# Patient Record
Sex: FEMALE | Race: BLACK OR AFRICAN AMERICAN | NOT HISPANIC OR LATINO | Employment: FULL TIME | URBAN - METROPOLITAN AREA
[De-identification: names, ages, dates, MRNs, and addresses within clinical notes are randomized per-mention and may not be internally consistent; named-entity substitution may affect disease eponyms.]

---

## 2017-07-31 ENCOUNTER — TELEPHONE (OUTPATIENT)
Dept: INTERNAL MEDICINE | Facility: CLINIC | Age: 42
End: 2017-07-31

## 2017-07-31 NOTE — TELEPHONE ENCOUNTER
----- Message from Diane Cesar sent at 7/31/2017  2:43 PM CDT -----  Contact: Dr Margaret Ansari - [phone] to get patients latest labs faxed to them, please fax to [phone]

## 2017-10-12 ENCOUNTER — CLINICAL SUPPORT (OUTPATIENT)
Dept: OTHER | Facility: CLINIC | Age: 42
End: 2017-10-12
Payer: COMMERCIAL

## 2017-10-12 DIAGNOSIS — Z00.8 HEALTH EXAMINATION IN POPULATION SURVEYS: Primary | ICD-10-CM

## 2017-10-12 PROCEDURE — 82947 ASSAY GLUCOSE BLOOD QUANT: CPT | Mod: QW,S$GLB,, | Performed by: INTERNAL MEDICINE

## 2017-10-12 PROCEDURE — 99401 PREV MED CNSL INDIV APPRX 15: CPT | Mod: S$GLB,,, | Performed by: INTERNAL MEDICINE

## 2017-10-12 PROCEDURE — 80061 LIPID PANEL: CPT | Mod: QW,S$GLB,, | Performed by: INTERNAL MEDICINE

## 2017-10-13 VITALS
WEIGHT: 154 LBS | DIASTOLIC BLOOD PRESSURE: 72 MMHG | SYSTOLIC BLOOD PRESSURE: 124 MMHG | BODY MASS INDEX: 23.34 KG/M2 | HEIGHT: 68 IN

## 2017-10-13 LAB
GLUCOSE SERPL-MCNC: NORMAL MG/DL (ref 60–140)
POC CHOLESTEROL, HDL: 51 MG/DL (ref 40–?)
POC CHOLESTEROL, LDL: NORMAL MG/DL (ref ?–160)
POC CHOLESTEROL, TOTAL: 135 MG/DL (ref ?–240)
POC GLUCOSE FASTING: 99 MG/DL (ref 60–110)
POC TOTAL CHOLESTEROL / HDL RATIO: 2.65 (ref ?–6)
POC TRIGLYCERIDES: 44 MG/DL (ref ?–160)

## 2017-12-21 ENCOUNTER — LAB VISIT (OUTPATIENT)
Dept: LAB | Facility: HOSPITAL | Age: 42
End: 2017-12-21
Attending: INTERNAL MEDICINE
Payer: COMMERCIAL

## 2017-12-21 DIAGNOSIS — Z00.00 PREVENTATIVE HEALTH CARE: ICD-10-CM

## 2017-12-21 LAB
25(OH)D3+25(OH)D2 SERPL-MCNC: 31 NG/ML
ALBUMIN SERPL BCP-MCNC: 4.1 G/DL
ALP SERPL-CCNC: 73 U/L
ALT SERPL W/O P-5'-P-CCNC: 13 U/L
ANION GAP SERPL CALC-SCNC: 8 MMOL/L
AST SERPL-CCNC: 13 U/L
BASOPHILS # BLD AUTO: 0.03 K/UL
BASOPHILS NFR BLD: 0.4 %
BILIRUB SERPL-MCNC: 1.1 MG/DL
BUN SERPL-MCNC: 12 MG/DL
CALCIUM SERPL-MCNC: 9.7 MG/DL
CHLORIDE SERPL-SCNC: 105 MMOL/L
CHOLEST SERPL-MCNC: 143 MG/DL
CHOLEST/HDLC SERPL: 2.7 {RATIO}
CO2 SERPL-SCNC: 26 MMOL/L
CREAT SERPL-MCNC: 0.8 MG/DL
DIFFERENTIAL METHOD: ABNORMAL
EOSINOPHIL # BLD AUTO: 0.2 K/UL
EOSINOPHIL NFR BLD: 2.3 %
ERYTHROCYTE [DISTWIDTH] IN BLOOD BY AUTOMATED COUNT: 13.3 %
EST. GFR  (AFRICAN AMERICAN): >60 ML/MIN/1.73 M^2
EST. GFR  (NON AFRICAN AMERICAN): >60 ML/MIN/1.73 M^2
GLUCOSE SERPL-MCNC: 76 MG/DL
HCT VFR BLD AUTO: 36.1 %
HDLC SERPL-MCNC: 53 MG/DL
HDLC SERPL: 37.1 %
HGB BLD-MCNC: 12.1 G/DL
IMM GRANULOCYTES # BLD AUTO: 0.02 K/UL
IMM GRANULOCYTES NFR BLD AUTO: 0.2 %
LDLC SERPL CALC-MCNC: 79.2 MG/DL
LYMPHOCYTES # BLD AUTO: 2.6 K/UL
LYMPHOCYTES NFR BLD: 31.3 %
MCH RBC QN AUTO: 29.5 PG
MCHC RBC AUTO-ENTMCNC: 33.5 G/DL
MCV RBC AUTO: 88 FL
MONOCYTES # BLD AUTO: 0.5 K/UL
MONOCYTES NFR BLD: 5.5 %
NEUTROPHILS # BLD AUTO: 5.1 K/UL
NEUTROPHILS NFR BLD: 60.3 %
NONHDLC SERPL-MCNC: 90 MG/DL
NRBC BLD-RTO: 0 /100 WBC
PLATELET # BLD AUTO: 292 K/UL
PMV BLD AUTO: 10.9 FL
POTASSIUM SERPL-SCNC: 3.7 MMOL/L
PROT SERPL-MCNC: 7.7 G/DL
RBC # BLD AUTO: 4.1 M/UL
SODIUM SERPL-SCNC: 139 MMOL/L
TRIGL SERPL-MCNC: 54 MG/DL
TSH SERPL DL<=0.005 MIU/L-ACNC: 1.52 UIU/ML
WBC # BLD AUTO: 8.39 K/UL

## 2017-12-21 PROCEDURE — 80053 COMPREHEN METABOLIC PANEL: CPT

## 2017-12-21 PROCEDURE — 82306 VITAMIN D 25 HYDROXY: CPT

## 2017-12-21 PROCEDURE — 80061 LIPID PANEL: CPT

## 2017-12-21 PROCEDURE — 85025 COMPLETE CBC W/AUTO DIFF WBC: CPT

## 2017-12-21 PROCEDURE — 36415 COLL VENOUS BLD VENIPUNCTURE: CPT | Mod: PO

## 2017-12-21 PROCEDURE — 84443 ASSAY THYROID STIM HORMONE: CPT

## 2017-12-22 ENCOUNTER — PATIENT OUTREACH (OUTPATIENT)
Dept: ADMINISTRATIVE | Facility: HOSPITAL | Age: 42
End: 2017-12-22

## 2018-01-15 ENCOUNTER — OFFICE VISIT (OUTPATIENT)
Dept: INTERNAL MEDICINE | Facility: CLINIC | Age: 43
End: 2018-01-15
Payer: COMMERCIAL

## 2018-01-15 VITALS
HEART RATE: 82 BPM | OXYGEN SATURATION: 99 % | TEMPERATURE: 98 F | WEIGHT: 149.94 LBS | HEIGHT: 68 IN | BODY MASS INDEX: 22.72 KG/M2 | DIASTOLIC BLOOD PRESSURE: 68 MMHG | SYSTOLIC BLOOD PRESSURE: 112 MMHG

## 2018-01-15 DIAGNOSIS — Z00.00 ROUTINE GENERAL MEDICAL EXAMINATION AT A HEALTH CARE FACILITY: Primary | ICD-10-CM

## 2018-01-15 PROCEDURE — 99999 PR PBB SHADOW E&M-EST. PATIENT-LVL III: CPT | Mod: PBBFAC,,, | Performed by: INTERNAL MEDICINE

## 2018-01-15 PROCEDURE — 99396 PREV VISIT EST AGE 40-64: CPT | Mod: S$GLB,,, | Performed by: INTERNAL MEDICINE

## 2018-01-15 NOTE — PROGRESS NOTES
"Subjective:      Patient ID: Navi Antony is a 42 y.o. female.    Chief Complaint: Annual Exam    41 yo with Patient Active Problem List:     Vitamin D deficiency    Here today for annual exam.  She is feeling well in her usual state of health.  She is typically compliant with her  vitamin D.  Up-to-date with GYN.  Never smoked.  No family history of colon cancer.    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   HENT: Negative for ear pain and sore throat.    Respiratory: Negative for cough.    Cardiovascular: Negative for chest pain.   Gastrointestinal: Negative for abdominal pain and blood in stool.   Genitourinary: Negative for dysuria and hematuria.   Neurological: Negative for seizures and syncope.     Objective:   /68   Pulse 82   Temp 98.4 °F (36.9 °C) (Tympanic)   Ht 5' 8" (1.727 m)   Wt 68 kg (149 lb 14.6 oz)   SpO2 99%   BMI 22.79 kg/m²     Physical Exam   Constitutional: She is oriented to person, place, and time. She appears well-developed and well-nourished. No distress.   HENT:   Head: Normocephalic and atraumatic.   Mouth/Throat: Oropharynx is clear and moist.   Eyes: EOM are normal. Pupils are equal, round, and reactive to light.   Neck: Neck supple. No thyromegaly present. "   Cardiovascular: Normal rate and regular rhythm.    Pulmonary/Chest: Breath sounds normal. She has no wheezes. She has no rales.   Abdominal: Soft. Bowel sounds are normal. There is no tenderness.   Musculoskeletal: She exhibits no edema.   Lymphadenopathy:     She has no cervical adenopathy.   Neurological: She is alert and oriented to person, place, and time.   Skin: Skin is warm and dry.   Psychiatric: She has a normal mood and affect. Her behavior is normal.       Assessment:     1. Routine general medical examination at a health care facility      Plan:   Routine general medical examination at a health care facility  -     CBC auto differential; Future; Expected date: 01/15/2019  -     Comprehensive metabolic panel; Future; Expected date: 01/15/2019  -     Hemoglobin A1c; Future; Expected date: 01/15/2019  -     Lipid panel; Future; Expected date: 01/15/2019  -     TSH; Future; Expected date: 01/15/2019  -     Vitamin D; Future; Expected date: 01/15/2019      Heart healthy diet and regular exercise.  Health maintenance reviewed with patient.      Problem List Items Addressed This Visit     None      Visit Diagnoses     Routine general medical examination at a health care facility    -  Primary    Relevant Orders    CBC auto differential    Comprehensive metabolic panel    Hemoglobin A1c    Lipid panel    TSH    Vitamin D          Follow-up in about 1 year (around 1/15/2019), or if symptoms worsen or fail to improve.

## 2018-10-23 ENCOUNTER — CLINICAL SUPPORT (OUTPATIENT)
Dept: OTHER | Facility: CLINIC | Age: 43
End: 2018-10-23
Payer: COMMERCIAL

## 2018-10-23 DIAGNOSIS — Z00.8 ENCOUNTER FOR OTHER GENERAL EXAMINATION: ICD-10-CM

## 2018-10-23 PROCEDURE — 99401 PREV MED CNSL INDIV APPRX 15: CPT | Mod: S$GLB,,, | Performed by: INTERNAL MEDICINE

## 2018-10-23 PROCEDURE — 82947 ASSAY GLUCOSE BLOOD QUANT: CPT | Mod: QW,S$GLB,, | Performed by: INTERNAL MEDICINE

## 2018-10-23 PROCEDURE — 80061 LIPID PANEL: CPT | Mod: QW,S$GLB,, | Performed by: INTERNAL MEDICINE

## 2018-10-24 VITALS — BODY MASS INDEX: 22.79 KG/M2 | HEIGHT: 68 IN

## 2018-10-24 LAB
HDLC SERPL-MCNC: 51 MG/DL
POC CHOLESTEROL, LDL (DOCK): 67 MG/DL
POC CHOLESTEROL, TOTAL: 128 MG/DL
POC GLUCOSE, FASTING: 94 MG/DL
TRIGL SERPL-MCNC: 47 MG/DL

## 2019-01-04 ENCOUNTER — LAB VISIT (OUTPATIENT)
Dept: LAB | Facility: HOSPITAL | Age: 44
End: 2019-01-04
Attending: INTERNAL MEDICINE
Payer: COMMERCIAL

## 2019-01-04 DIAGNOSIS — Z00.00 ROUTINE GENERAL MEDICAL EXAMINATION AT A HEALTH CARE FACILITY: ICD-10-CM

## 2019-01-04 LAB
25(OH)D3+25(OH)D2 SERPL-MCNC: 16 NG/ML
ALBUMIN SERPL BCP-MCNC: 3.8 G/DL
ALP SERPL-CCNC: 64 U/L
ALT SERPL W/O P-5'-P-CCNC: 11 U/L
ANION GAP SERPL CALC-SCNC: 8 MMOL/L
AST SERPL-CCNC: 13 U/L
BASOPHILS # BLD AUTO: 0.04 K/UL
BASOPHILS NFR BLD: 0.5 %
BILIRUB SERPL-MCNC: 0.6 MG/DL
BUN SERPL-MCNC: 17 MG/DL
CALCIUM SERPL-MCNC: 9.2 MG/DL
CHLORIDE SERPL-SCNC: 106 MMOL/L
CHOLEST SERPL-MCNC: 148 MG/DL
CHOLEST/HDLC SERPL: 2.6 {RATIO}
CO2 SERPL-SCNC: 25 MMOL/L
CREAT SERPL-MCNC: 0.7 MG/DL
DIFFERENTIAL METHOD: NORMAL
EOSINOPHIL # BLD AUTO: 0.2 K/UL
EOSINOPHIL NFR BLD: 2.5 %
ERYTHROCYTE [DISTWIDTH] IN BLOOD BY AUTOMATED COUNT: 13.5 %
EST. GFR  (AFRICAN AMERICAN): >60 ML/MIN/1.73 M^2
EST. GFR  (NON AFRICAN AMERICAN): >60 ML/MIN/1.73 M^2
ESTIMATED AVG GLUCOSE: 100 MG/DL
GLUCOSE SERPL-MCNC: 85 MG/DL
HBA1C MFR BLD HPLC: 5.1 %
HCT VFR BLD AUTO: 38.3 %
HDLC SERPL-MCNC: 58 MG/DL
HDLC SERPL: 39.2 %
HGB BLD-MCNC: 12.4 G/DL
IMM GRANULOCYTES # BLD AUTO: 0.02 K/UL
IMM GRANULOCYTES NFR BLD AUTO: 0.2 %
LDLC SERPL CALC-MCNC: 79 MG/DL
LYMPHOCYTES # BLD AUTO: 2.5 K/UL
LYMPHOCYTES NFR BLD: 31.3 %
MCH RBC QN AUTO: 29.5 PG
MCHC RBC AUTO-ENTMCNC: 32.4 G/DL
MCV RBC AUTO: 91 FL
MONOCYTES # BLD AUTO: 0.5 K/UL
MONOCYTES NFR BLD: 5.8 %
NEUTROPHILS # BLD AUTO: 4.8 K/UL
NEUTROPHILS NFR BLD: 59.7 %
NONHDLC SERPL-MCNC: 90 MG/DL
NRBC BLD-RTO: 0 /100 WBC
PLATELET # BLD AUTO: 296 K/UL
PMV BLD AUTO: 11.2 FL
POTASSIUM SERPL-SCNC: 3.8 MMOL/L
PROT SERPL-MCNC: 7.4 G/DL
RBC # BLD AUTO: 4.21 M/UL
SODIUM SERPL-SCNC: 139 MMOL/L
TRIGL SERPL-MCNC: 55 MG/DL
TSH SERPL DL<=0.005 MIU/L-ACNC: 2.91 UIU/ML
WBC # BLD AUTO: 8.09 K/UL

## 2019-01-04 PROCEDURE — 84443 ASSAY THYROID STIM HORMONE: CPT

## 2019-01-04 PROCEDURE — 80061 LIPID PANEL: CPT

## 2019-01-04 PROCEDURE — 85025 COMPLETE CBC W/AUTO DIFF WBC: CPT

## 2019-01-04 PROCEDURE — 36415 COLL VENOUS BLD VENIPUNCTURE: CPT | Mod: PO

## 2019-01-04 PROCEDURE — 80053 COMPREHEN METABOLIC PANEL: CPT

## 2019-01-04 PROCEDURE — 83036 HEMOGLOBIN GLYCOSYLATED A1C: CPT

## 2019-01-04 PROCEDURE — 82306 VITAMIN D 25 HYDROXY: CPT

## 2019-01-17 ENCOUNTER — OFFICE VISIT (OUTPATIENT)
Dept: INTERNAL MEDICINE | Facility: CLINIC | Age: 44
End: 2019-01-17
Payer: COMMERCIAL

## 2019-01-17 VITALS
OXYGEN SATURATION: 99 % | SYSTOLIC BLOOD PRESSURE: 114 MMHG | WEIGHT: 152.13 LBS | BODY MASS INDEX: 23.13 KG/M2 | DIASTOLIC BLOOD PRESSURE: 72 MMHG | HEART RATE: 70 BPM | TEMPERATURE: 98 F

## 2019-01-17 DIAGNOSIS — E55.9 VITAMIN D DEFICIENCY: ICD-10-CM

## 2019-01-17 DIAGNOSIS — Z00.00 ROUTINE GENERAL MEDICAL EXAMINATION AT A HEALTH CARE FACILITY: Primary | ICD-10-CM

## 2019-01-17 PROCEDURE — 99396 PR PREVENTIVE VISIT,EST,40-64: ICD-10-PCS | Mod: S$GLB,,, | Performed by: INTERNAL MEDICINE

## 2019-01-17 PROCEDURE — 99396 PREV VISIT EST AGE 40-64: CPT | Mod: S$GLB,,, | Performed by: INTERNAL MEDICINE

## 2019-01-17 PROCEDURE — 99999 PR PBB SHADOW E&M-EST. PATIENT-LVL III: ICD-10-PCS | Mod: PBBFAC,,, | Performed by: INTERNAL MEDICINE

## 2019-01-17 PROCEDURE — 99999 PR PBB SHADOW E&M-EST. PATIENT-LVL III: CPT | Mod: PBBFAC,,, | Performed by: INTERNAL MEDICINE

## 2019-01-17 NOTE — PROGRESS NOTES
Subjective:      Patient ID: Navi Antony is a 43 y.o. female.    Chief Complaint: Follow-up    HPI   42 yo with   Patient Active Problem List   Diagnosis    Vitamin D deficiency     History reviewed. No pertinent past medical history.  Here today for annual prev exam. Energy and appetite are good.     Non smoker. Up to date with gyn. No f/h of colon cancer.   Review of Systems   Constitutional: Negative for chills and fever.   HENT: Negative for ear pain and sore throat.    Respiratory: Negative for cough.    Cardiovascular: Negative for chest pain.   Gastrointestinal: Negative for abdominal pain and blood in stool.   Genitourinary: Negative for dysuria and hematuria.   Neurological: Negative for seizures and syncope.     Objective:   /72 (BP Location: Left arm, Patient Position: Sitting, BP Method: Medium (Automatic))   Pulse 70   Temp 98 °F (36.7 °C) (Oral)   Wt 69 kg (152 lb 1.9 oz)   LMP 01/10/2019   SpO2 99%   BMI 23.13 kg/m²     Physical Exam   Constitutional: She is oriented to person, place, and time. She appears well-developed and well-nourished. No distress.   HENT:   Head: Normocephalic and atraumatic.   Mouth/Throat: Oropharynx is clear and moist.   Eyes: EOM are normal. Pupils are equal, round, and reactive to light.   Neck: Neck supple. No thyromegaly present.   Cardiovascular: Normal rate and regular rhythm.   Pulmonary/Chest: Breath sounds normal. She has no wheezes. She has no rales.   Abdominal: Soft. Bowel sounds are normal. There is no tenderness.   Musculoskeletal: She exhibits no edema.   Lymphadenopathy:     She has no cervical adenopathy.   Neurological: She is alert and oriented to person, place, and time.   Skin: Skin is warm and dry.   Psychiatric: She has a normal mood and affect. Her behavior is normal.     Lab Visit on 01/04/2019   Component Date Value Ref Range Status    WBC 01/04/2019 8.09  3.90 - 12.70 K/uL Final    RBC 01/04/2019 4.21  4.00 - 5.40 M/uL Final     Hemoglobin 01/04/2019 12.4  12.0 - 16.0 g/dL Final    Hematocrit 01/04/2019 38.3  37.0 - 48.5 % Final    MCV 01/04/2019 91  82 - 98 fL Final    MCH 01/04/2019 29.5  27.0 - 31.0 pg Final    MCHC 01/04/2019 32.4  32.0 - 36.0 g/dL Final    RDW 01/04/2019 13.5  11.5 - 14.5 % Final    Platelets 01/04/2019 296  150 - 350 K/uL Final    MPV 01/04/2019 11.2  9.2 - 12.9 fL Final    Immature Granulocytes 01/04/2019 0.2  0.0 - 0.5 % Final    Gran # (ANC) 01/04/2019 4.8  1.8 - 7.7 K/uL Final    Immature Grans (Abs) 01/04/2019 0.02  0.00 - 0.04 K/uL Final    Comment: Mild elevation in immature granulocytes is non specific and   can be seen in a variety of conditions including stress response,   acute inflammation, trauma and pregnancy. Correlation with other   laboratory and clinical findings is essential.      Lymph # 01/04/2019 2.5  1.0 - 4.8 K/uL Final    Mono # 01/04/2019 0.5  0.3 - 1.0 K/uL Final    Eos # 01/04/2019 0.2  0.0 - 0.5 K/uL Final    Baso # 01/04/2019 0.04  0.00 - 0.20 K/uL Final    nRBC 01/04/2019 0  0 /100 WBC Final    Gran% 01/04/2019 59.7  38.0 - 73.0 % Final    Lymph% 01/04/2019 31.3  18.0 - 48.0 % Final    Mono% 01/04/2019 5.8  4.0 - 15.0 % Final    Eosinophil% 01/04/2019 2.5  0.0 - 8.0 % Final    Basophil% 01/04/2019 0.5  0.0 - 1.9 % Final    Differential Method 01/04/2019 Automated   Final    Sodium 01/04/2019 139  136 - 145 mmol/L Final    Potassium 01/04/2019 3.8  3.5 - 5.1 mmol/L Final    Chloride 01/04/2019 106  95 - 110 mmol/L Final    CO2 01/04/2019 25  23 - 29 mmol/L Final    Glucose 01/04/2019 85  70 - 110 mg/dL Final    BUN, Bld 01/04/2019 17  6 - 20 mg/dL Final    Creatinine 01/04/2019 0.7  0.5 - 1.4 mg/dL Final    Calcium 01/04/2019 9.2  8.7 - 10.5 mg/dL Final    Total Protein 01/04/2019 7.4  6.0 - 8.4 g/dL Final    Albumin 01/04/2019 3.8  3.5 - 5.2 g/dL Final    Total Bilirubin 01/04/2019 0.6  0.1 - 1.0 mg/dL Final    Comment: For infants and newborns,  interpretation of results should be based  on gestational age, weight and in agreement with clinical  observations.  Premature Infant recommended reference ranges:  Up to 24 hours.............<8.0 mg/dL  Up to 48 hours............<12.0 mg/dL  3-5 days..................<15.0 mg/dL  6-29 days.................<15.0 mg/dL      Alkaline Phosphatase 01/04/2019 64  55 - 135 U/L Final    AST 01/04/2019 13  10 - 40 U/L Final    ALT 01/04/2019 11  10 - 44 U/L Final    Anion Gap 01/04/2019 8  8 - 16 mmol/L Final    eGFR if African American 01/04/2019 >60.0  >60 mL/min/1.73 m^2 Final    eGFR if non African American 01/04/2019 >60.0  >60 mL/min/1.73 m^2 Final    Comment: Calculation used to obtain the estimated glomerular filtration  rate (eGFR) is the CKD-EPI equation.       Hemoglobin A1C 01/04/2019 5.1  4.0 - 5.6 % Final    Comment: ADA Screening Guidelines:  5.7-6.4%  Consistent with prediabetes  >or=6.5%  Consistent with diabetes  High levels of fetal hemoglobin interfere with the HbA1C  assay. Heterozygous hemoglobin variants (HbS, HgC, etc)do  not significantly interfere with this assay.   However, presence of multiple variants may affect accuracy.      Estimated Avg Glucose 01/04/2019 100  68 - 131 mg/dL Final    Cholesterol 01/04/2019 148  120 - 199 mg/dL Final    Comment: The National Cholesterol Education Program (NCEP) has set the  following guidelines (reference ranges) for Cholesterol:  Optimal.....................<200 mg/dL  Borderline High.............200-239 mg/dL  High........................> or = 240 mg/dL      Triglycerides 01/04/2019 55  30 - 150 mg/dL Final    Comment: The National Cholesterol Education Program (NCEP) has set the  following guidelines (reference values) for triglycerides:  Normal......................<150 mg/dL  Borderline High.............150-199 mg/dL  High........................200-499 mg/dL      HDL 01/04/2019 58  40 - 75 mg/dL Final    Comment: The National Cholesterol  Education Program (NCEP) has set the  following guidelines (reference values) for HDL Cholesterol:  Low...............<40 mg/dL  Optimal...........>60 mg/dL      LDL Cholesterol 01/04/2019 79.0  63.0 - 159.0 mg/dL Final    Comment: The National Cholesterol Education Program (NCEP) has set the  following guidelines (reference values) for LDL Cholesterol:  Optimal.......................<130 mg/dL  Borderline High...............130-159 mg/dL  High..........................160-189 mg/dL  Very High.....................>190 mg/dL      HDL/Chol Ratio 01/04/2019 39.2  20.0 - 50.0 % Final    Total Cholesterol/HDL Ratio 01/04/2019 2.6  2.0 - 5.0 Final    Non-HDL Cholesterol 01/04/2019 90  mg/dL Final    Comment: Risk category and Non-HDL cholesterol goals:  Coronary heart disease (CHD)or equivalent (10-year risk of CHD >20%):  Non-HDL cholesterol goal     <130 mg/dL  Two or more CHD risk factors and 10-year risk of CHD <= 20%:  Non-HDL cholesterol goal     <160 mg/dL  0 to 1 CHD risk factor:  Non-HDL cholesterol goal     <190 mg/dL      TSH 01/04/2019 2.912  0.400 - 4.000 uIU/mL Final    Vit D, 25-Hydroxy 01/04/2019 16* 30 - 96 ng/mL Final    Comment: Vitamin D deficiency.........<10 ng/mL                              Vitamin D insufficiency......10-29 ng/mL       Vitamin D sufficiency........> or equal to 30 ng/mL  Vitamin D toxicity............>100 ng/mL         Assessment:     1. Routine general medical examination at a health care facility    2. Vitamin D deficiency      Plan:   Routine general medical examination at a health care facility  -     CBC auto differential; Future; Expected date: 01/17/2020  -     Hemoglobin A1c; Future; Expected date: 01/17/2020  -     Lipid panel; Future; Expected date: 01/17/2020  -     Comprehensive metabolic panel; Future; Expected date: 01/17/2020  -     TSH; Future; Expected date: 01/17/2020  -     Vitamin D; Future; Expected date: 01/17/2020    Vitamin D deficiency      Heart  healthy diet and reg exercise   reviewed        Problem List Items Addressed This Visit        Endocrine    Vitamin D deficiency      Other Visit Diagnoses     Routine general medical examination at a health care facility    -  Primary    Relevant Orders    CBC auto differential    Hemoglobin A1c    Lipid panel    Comprehensive metabolic panel    TSH    Vitamin D          Follow-up in about 1 year (around 1/17/2020).

## 2021-02-19 ENCOUNTER — PATIENT OUTREACH (OUTPATIENT)
Dept: ADMINISTRATIVE | Facility: HOSPITAL | Age: 46
End: 2021-02-19